# Patient Record
Sex: FEMALE
[De-identification: names, ages, dates, MRNs, and addresses within clinical notes are randomized per-mention and may not be internally consistent; named-entity substitution may affect disease eponyms.]

---

## 2017-12-17 ENCOUNTER — HEALTH MAINTENANCE LETTER (OUTPATIENT)
Age: 47
End: 2017-12-17

## 2018-05-22 ENCOUNTER — RECORDS - HEALTHEAST (OUTPATIENT)
Dept: ADMINISTRATIVE | Facility: OTHER | Age: 48
End: 2018-05-22

## 2018-05-25 ENCOUNTER — AMBULATORY - HEALTHEAST (OUTPATIENT)
Dept: SURGERY | Facility: CLINIC | Age: 48
End: 2018-05-25

## 2018-05-25 DIAGNOSIS — E66.01 MORBID OBESITY (H): ICD-10-CM

## 2018-05-30 ENCOUNTER — COMMUNICATION - HEALTHEAST (OUTPATIENT)
Dept: SURGERY | Facility: CLINIC | Age: 48
End: 2018-05-30

## 2018-06-12 ENCOUNTER — COMMUNICATION - HEALTHEAST (OUTPATIENT)
Dept: SURGERY | Facility: CLINIC | Age: 48
End: 2018-06-12

## 2018-08-15 ENCOUNTER — OFFICE VISIT - HEALTHEAST (OUTPATIENT)
Dept: SURGERY | Facility: CLINIC | Age: 48
End: 2018-08-15

## 2018-08-15 ENCOUNTER — AMBULATORY - HEALTHEAST (OUTPATIENT)
Dept: LAB | Facility: CLINIC | Age: 48
End: 2018-08-15

## 2018-08-15 DIAGNOSIS — I10 HYPERTENSION, UNSPECIFIED TYPE: ICD-10-CM

## 2018-08-15 DIAGNOSIS — E66.01 MORBID OBESITY (H): ICD-10-CM

## 2018-08-15 DIAGNOSIS — E78.5 HYPERLIPIDEMIA, UNSPECIFIED HYPERLIPIDEMIA TYPE: ICD-10-CM

## 2018-08-15 DIAGNOSIS — F32.A DEPRESSION, UNSPECIFIED DEPRESSION TYPE: ICD-10-CM

## 2018-08-15 DIAGNOSIS — R73.03 PRE-DIABETES: ICD-10-CM

## 2018-08-15 DIAGNOSIS — G47.33 OBSTRUCTIVE SLEEP APNEA: ICD-10-CM

## 2018-08-15 DIAGNOSIS — F41.9 ANXIETY: ICD-10-CM

## 2018-08-15 LAB — HBA1C MFR BLD: 5.7 % (ref 3.5–6)

## 2018-08-15 ASSESSMENT — MIFFLIN-ST. JEOR: SCORE: 2171.04

## 2018-09-21 ENCOUNTER — OFFICE VISIT - HEALTHEAST (OUTPATIENT)
Dept: SURGERY | Facility: CLINIC | Age: 48
End: 2018-09-21

## 2018-09-21 DIAGNOSIS — E66.01 OBESITY, MORBID, BMI 50 OR HIGHER (H): ICD-10-CM

## 2018-09-21 DIAGNOSIS — R73.03 PRE-DIABETES: ICD-10-CM

## 2018-09-21 DIAGNOSIS — Z71.3 DIETARY COUNSELING: ICD-10-CM

## 2018-09-21 DIAGNOSIS — I10 HYPERTENSION, UNSPECIFIED TYPE: ICD-10-CM

## 2018-09-21 DIAGNOSIS — E78.5 HYPERLIPIDEMIA, UNSPECIFIED HYPERLIPIDEMIA TYPE: ICD-10-CM

## 2018-09-21 ASSESSMENT — MIFFLIN-ST. JEOR: SCORE: 2166.51

## 2018-09-24 ENCOUNTER — AMBULATORY - HEALTHEAST (OUTPATIENT)
Dept: SURGERY | Facility: CLINIC | Age: 48
End: 2018-09-24

## 2018-09-24 DIAGNOSIS — E66.01 MORBID OBESITY (H): ICD-10-CM

## 2018-09-28 ENCOUNTER — COMMUNICATION - HEALTHEAST (OUTPATIENT)
Dept: SURGERY | Facility: CLINIC | Age: 48
End: 2018-09-28

## 2018-10-11 ENCOUNTER — OFFICE VISIT - HEALTHEAST (OUTPATIENT)
Dept: PHYSICAL THERAPY | Facility: REHABILITATION | Age: 48
End: 2018-10-11

## 2018-10-11 DIAGNOSIS — Z74.09 DECREASED FUNCTIONAL MOBILITY AND ENDURANCE: ICD-10-CM

## 2018-10-11 DIAGNOSIS — G89.29 CHRONIC MIDLINE LOW BACK PAIN WITHOUT SCIATICA: ICD-10-CM

## 2018-10-11 DIAGNOSIS — M54.50 CHRONIC MIDLINE LOW BACK PAIN WITHOUT SCIATICA: ICD-10-CM

## 2018-10-15 ENCOUNTER — OFFICE VISIT - HEALTHEAST (OUTPATIENT)
Dept: PHYSICAL THERAPY | Facility: REHABILITATION | Age: 48
End: 2018-10-15

## 2018-10-15 DIAGNOSIS — F41.9 ANXIETY: ICD-10-CM

## 2018-10-15 DIAGNOSIS — M54.50 CHRONIC MIDLINE LOW BACK PAIN WITHOUT SCIATICA: ICD-10-CM

## 2018-10-15 DIAGNOSIS — F32.A DEPRESSION, UNSPECIFIED DEPRESSION TYPE: ICD-10-CM

## 2018-10-15 DIAGNOSIS — E78.5 HYPERLIPIDEMIA, UNSPECIFIED HYPERLIPIDEMIA TYPE: ICD-10-CM

## 2018-10-15 DIAGNOSIS — Z74.09 DECREASED FUNCTIONAL MOBILITY AND ENDURANCE: ICD-10-CM

## 2018-10-15 DIAGNOSIS — G89.29 CHRONIC MIDLINE LOW BACK PAIN WITHOUT SCIATICA: ICD-10-CM

## 2018-10-16 ENCOUNTER — OFFICE VISIT - HEALTHEAST (OUTPATIENT)
Dept: SURGERY | Facility: CLINIC | Age: 48
End: 2018-10-16

## 2018-10-16 DIAGNOSIS — E66.01 MORBID OBESITY (H): ICD-10-CM

## 2018-10-16 DIAGNOSIS — R73.03 PRE-DIABETES: ICD-10-CM

## 2018-10-16 ASSESSMENT — MIFFLIN-ST. JEOR: SCORE: 2159.7

## 2018-10-24 ENCOUNTER — OFFICE VISIT - HEALTHEAST (OUTPATIENT)
Dept: PHYSICAL THERAPY | Facility: REHABILITATION | Age: 48
End: 2018-10-24

## 2018-10-24 DIAGNOSIS — F32.A DEPRESSION, UNSPECIFIED DEPRESSION TYPE: ICD-10-CM

## 2018-10-24 DIAGNOSIS — Z74.09 DECREASED FUNCTIONAL MOBILITY AND ENDURANCE: ICD-10-CM

## 2018-10-24 DIAGNOSIS — E78.5 HYPERLIPIDEMIA, UNSPECIFIED HYPERLIPIDEMIA TYPE: ICD-10-CM

## 2018-10-24 DIAGNOSIS — G89.29 CHRONIC MIDLINE LOW BACK PAIN WITHOUT SCIATICA: ICD-10-CM

## 2018-10-24 DIAGNOSIS — F41.9 ANXIETY: ICD-10-CM

## 2018-10-24 DIAGNOSIS — M54.50 CHRONIC MIDLINE LOW BACK PAIN WITHOUT SCIATICA: ICD-10-CM

## 2018-10-30 ENCOUNTER — OFFICE VISIT - HEALTHEAST (OUTPATIENT)
Dept: PHYSICAL THERAPY | Facility: REHABILITATION | Age: 48
End: 2018-10-30

## 2018-10-30 DIAGNOSIS — Z74.09 DECREASED FUNCTIONAL MOBILITY AND ENDURANCE: ICD-10-CM

## 2018-10-30 DIAGNOSIS — G89.29 CHRONIC MIDLINE LOW BACK PAIN WITHOUT SCIATICA: ICD-10-CM

## 2018-10-30 DIAGNOSIS — M54.50 CHRONIC MIDLINE LOW BACK PAIN WITHOUT SCIATICA: ICD-10-CM

## 2018-11-06 ENCOUNTER — OFFICE VISIT - HEALTHEAST (OUTPATIENT)
Dept: PHYSICAL THERAPY | Facility: REHABILITATION | Age: 48
End: 2018-11-06

## 2018-11-06 DIAGNOSIS — M54.50 CHRONIC MIDLINE LOW BACK PAIN WITHOUT SCIATICA: ICD-10-CM

## 2018-11-06 DIAGNOSIS — Z74.09 DECREASED FUNCTIONAL MOBILITY AND ENDURANCE: ICD-10-CM

## 2018-11-06 DIAGNOSIS — G89.29 CHRONIC MIDLINE LOW BACK PAIN WITHOUT SCIATICA: ICD-10-CM

## 2018-11-20 ENCOUNTER — OFFICE VISIT - HEALTHEAST (OUTPATIENT)
Dept: PHYSICAL THERAPY | Facility: REHABILITATION | Age: 48
End: 2018-11-20

## 2018-11-20 DIAGNOSIS — G89.29 CHRONIC MIDLINE LOW BACK PAIN WITHOUT SCIATICA: ICD-10-CM

## 2018-11-20 DIAGNOSIS — Z74.09 DECREASED FUNCTIONAL MOBILITY AND ENDURANCE: ICD-10-CM

## 2018-11-20 DIAGNOSIS — M54.50 CHRONIC MIDLINE LOW BACK PAIN WITHOUT SCIATICA: ICD-10-CM

## 2018-12-18 ENCOUNTER — OFFICE VISIT - HEALTHEAST (OUTPATIENT)
Dept: SURGERY | Facility: CLINIC | Age: 48
End: 2018-12-18

## 2018-12-18 DIAGNOSIS — R73.03 PRE-DIABETES: ICD-10-CM

## 2018-12-18 DIAGNOSIS — E66.01 MORBID OBESITY (H): ICD-10-CM

## 2018-12-18 ASSESSMENT — MIFFLIN-ST. JEOR: SCORE: 2150.63

## 2018-12-19 ENCOUNTER — OFFICE VISIT - HEALTHEAST (OUTPATIENT)
Dept: PHYSICAL THERAPY | Facility: REHABILITATION | Age: 48
End: 2018-12-19

## 2018-12-19 DIAGNOSIS — G89.29 CHRONIC MIDLINE LOW BACK PAIN WITHOUT SCIATICA: ICD-10-CM

## 2018-12-19 DIAGNOSIS — F32.A DEPRESSION, UNSPECIFIED DEPRESSION TYPE: ICD-10-CM

## 2018-12-19 DIAGNOSIS — Z74.09 DECREASED FUNCTIONAL MOBILITY AND ENDURANCE: ICD-10-CM

## 2018-12-19 DIAGNOSIS — F41.9 ANXIETY: ICD-10-CM

## 2018-12-19 DIAGNOSIS — M54.50 CHRONIC MIDLINE LOW BACK PAIN WITHOUT SCIATICA: ICD-10-CM

## 2019-01-14 ENCOUNTER — AMBULATORY - HEALTHEAST (OUTPATIENT)
Dept: ADMINISTRATIVE | Facility: REHABILITATION | Age: 49
End: 2019-01-14

## 2019-01-14 DIAGNOSIS — M25.562 LEFT KNEE PAIN: ICD-10-CM

## 2019-01-18 ENCOUNTER — OFFICE VISIT - HEALTHEAST (OUTPATIENT)
Dept: PHYSICAL THERAPY | Facility: REHABILITATION | Age: 49
End: 2019-01-18

## 2019-01-18 DIAGNOSIS — M62.81 MUSCLE WEAKNESS (GENERALIZED): ICD-10-CM

## 2019-01-18 DIAGNOSIS — M25.562 ACUTE PAIN OF LEFT KNEE: ICD-10-CM

## 2019-01-25 ENCOUNTER — OFFICE VISIT - HEALTHEAST (OUTPATIENT)
Dept: PHYSICAL THERAPY | Facility: REHABILITATION | Age: 49
End: 2019-01-25

## 2019-01-25 DIAGNOSIS — M62.81 MUSCLE WEAKNESS (GENERALIZED): ICD-10-CM

## 2019-01-25 DIAGNOSIS — M25.562 ACUTE PAIN OF LEFT KNEE: ICD-10-CM

## 2019-02-19 ENCOUNTER — COMMUNICATION - HEALTHEAST (OUTPATIENT)
Dept: PHYSICAL THERAPY | Facility: REHABILITATION | Age: 49
End: 2019-02-19

## 2019-02-28 ENCOUNTER — OFFICE VISIT - HEALTHEAST (OUTPATIENT)
Dept: PHYSICAL THERAPY | Facility: REHABILITATION | Age: 49
End: 2019-02-28

## 2019-02-28 DIAGNOSIS — M62.81 MUSCLE WEAKNESS (GENERALIZED): ICD-10-CM

## 2019-02-28 DIAGNOSIS — Z74.09 DECREASED FUNCTIONAL MOBILITY AND ENDURANCE: ICD-10-CM

## 2019-02-28 DIAGNOSIS — M25.562 ACUTE PAIN OF LEFT KNEE: ICD-10-CM

## 2019-03-28 ENCOUNTER — COMMUNICATION - HEALTHEAST (OUTPATIENT)
Dept: SURGERY | Facility: CLINIC | Age: 49
End: 2019-03-28

## 2019-05-13 ENCOUNTER — AMBULATORY - HEALTHEAST (OUTPATIENT)
Dept: ADMINISTRATIVE | Facility: REHABILITATION | Age: 49
End: 2019-05-13

## 2019-05-13 DIAGNOSIS — G89.29 CHRONIC PAIN OF LEFT KNEE: ICD-10-CM

## 2019-05-13 DIAGNOSIS — M25.562 CHRONIC PAIN OF LEFT KNEE: ICD-10-CM

## 2020-03-02 ENCOUNTER — HEALTH MAINTENANCE LETTER (OUTPATIENT)
Age: 50
End: 2020-03-02

## 2020-12-20 ENCOUNTER — HEALTH MAINTENANCE LETTER (OUTPATIENT)
Age: 50
End: 2020-12-20

## 2021-01-15 ENCOUNTER — HEALTH MAINTENANCE LETTER (OUTPATIENT)
Age: 51
End: 2021-01-15

## 2021-04-24 ENCOUNTER — HEALTH MAINTENANCE LETTER (OUTPATIENT)
Age: 51
End: 2021-04-24

## 2021-07-15 VITALS
HEIGHT: 62 IN | BODY MASS INDEX: 53.92 KG/M2 | HEIGHT: 62 IN | HEIGHT: 62 IN | BODY MASS INDEX: 53.92 KG/M2 | HEIGHT: 62 IN | WEIGHT: 293 LBS | BODY MASS INDEX: 53.92 KG/M2 | BODY MASS INDEX: 53.92 KG/M2 | WEIGHT: 293 LBS | WEIGHT: 293 LBS | WEIGHT: 293 LBS

## 2021-07-15 NOTE — PROGRESS NOTES
[facility] Daily Progress     Patient Name: Nakia Melo  Date: 2018  Visit #:5  PTA visit #:    Date of evaluation: 10/11/2018  Referral Diagnosis: Morbid obesity (H), Back Pain   Referring provider: Nilam Simmons,*  Visit Diagnosis:     ICD-10-CM    1. Decreased functional mobility and endurance Z74.09    2. Chronic midline low back pain without sciatica M54.5     G89.29          Assessment:   Back Pain is resolving, she is more active in general and has more strength, energy and stamina than before, she is still working on consistency with walking but exercises are getting done regularly.   Patient is benefitting from skilled physical therapy and is making steady progress toward functional goals.  Patient is appropriate to continue with skilled physical therapy intervention, as indicated by initial plan of care.    Goal Status:  Pt. will be independent with home exercise program in : 12 weeks  Pt. will be able to walk : 20 minutes;30 minutes;with less pain;with less difficulty;for community mobility;for exercise/recreation;in 12 weeks  Pt. will improve gait speed : > 1.0m/s;for decreased risk of falls;for improved community ambulation;in 12 weeks  Patient will stand : 30 minutes;with less difficultty;for home chores;in 12 weeks  Pt will: increase 30 sec sit to stands to 14 reps or more in 10 weeks  Pt will: increase 2 minute walk distance to >150 meters wiht RPE of 4 or less in 10 weeks    Plan / Patient Education:     Continue with initial plan of care.  Progress with home program as tolerated.  Follow up in 2 weeks, progress exercises as able   Subjective:   Pain Ratin    Patient reports her feet are a little sore today. Exercises are going ok at home.     Objective:   Pt did well on the treadmill with increased speed and time. Subjective report of legs being tired but having more energy in general.     Treatment Today     TREATMENT MINUTES COMMENTS   Evaluation     Self-care/  Home management     Manual therapy     Neuromuscular Re-education     Therapeutic Activity     Therapeutic Exercises 43 - Treadmill @ 2.0 MPH x 8' RPE 8/10  - bridges 10 sec hold  x 10 reps   - TA set with march x 20 reps B  - SLR x 15 reps B  - hooklying hip abduction x 20 reps L3 band   -seated march with 4# weights arm curls 2 x10 B alternating sides   -LAQ with overhead press 4# weights 2 x 10 B alternating side  - Nustep WL 5  x5'   - Walk in clinic 3 minute walk test 174.4 meters RPE 9/10  -sit to stand x 20 from high low mat  -Multifidus 3 directions x10 each L3 band in standing   - standing side step with band to the side; L3 band preventing twisting x 10 reps each side    Gait training     Modality__________________                Total 43    Blank areas are intentional and mean the treatment did not include these items.     Laura Dominguez, PT, DPT, CLT  11/6/2018

## 2021-07-15 NOTE — PROGRESS NOTES
[facility]   [facility] Initial Evaluation    Patient Name: Nakia Melo  Date of evaluation: 10/11/2018  Referral Diagnosis: Morbid obesity (H)  Referring provider: Nilam Simmons,*  Visit Diagnosis:     ICD-10-CM    1. Decreased functional mobility and endurance Z74.09    2. Chronic midline low back pain without sciatica M54.5     G89.29        Assessment:     Nakia Melo is a 47 y.o. female who presents to therapy today with chief complaints of back pain limiting activities and weight loss goals. Patient presents below age gender norms in 30 sec sit to stands, 2 minute walk test and gait speed. Patient has mild balance issues with unstable surfaces and when eyes are closed with history of some vertigo issues. Patient is uncertain of what her own goals are currently but does enjoy walking which as been limited by back pain recently. Pateint demonstrated min dec in ROM in the lumbar spine with tightness in hamstrings but it does not increase her pain, low back felt fatigued with sit to stands and 2 min walk but not painful. Patient will benefit from skilled PT intervention to increase strength, endurance and ROM in order to improve overall mobility.     Patient will benefit from 1:1 skilled PT services to address the above limitations.     Goals:  Pt. will be independent with home exercise program in : 12 weeks  Pt. will be able to walk : 20 minutes;30 minutes;with less pain;with less difficulty;for community mobility;for exercise/recreation;in 12 weeks  Pt. will improve gait speed : > 1.0m/s;for decreased risk of falls;for improved community ambulation;in 12 weeks  Patient will stand : 30 minutes;with less difficultty;for home chores;in 12 weeks  Pt will: increase 30 sec sit to stands to 14 reps or more in 10 weeks  Pt will: increase 2 minute walk distance to >150 meters wiht RPE of 4 or less in 10 weeks    Patient's expectations/goals are realistic.    Barriers to Learning or  Achieving Goals:  Chronicity of the problem.  Co-morbidities or other medical factors.  JESSICA, HTN, depression, anxiety, pre-diabetes        Plan / Patient Instructions:        Plan of Care:   Authorization / Certification Start Date: 10/11/18  Authorization / Certification End Date: 01/03/19  Communication with: Referral Source  Patient Related Instruction: Nature of Condition;Treatment plan and rationale;Self Care instruction;Basis of treatment;Body mechanics;Posture;Precautions;Next steps;Expected outcome  Times per Week: 1  Number of Weeks: 12  Number of Visits: 12  Therapeutic Exercise: ROM;Stretching;Strengthening  Neuromuscular Reeducation: kinesio tape;posture;core;balance/proprioception    POC and pathology of condition were reviewed with patient.  Pt. is in agreement with the Plan of Care  A Home Exercise Program (HEP) was initiated today.  Pt. was instructed in exercises by PT and patient was given a handout with detailed instructions.    Plan for next visit: begin edurance exercises, NuStep. Walking, LE strengtheing exercises, core, develop HEP.     Treatment techniques, plan of care, and goals were discussed with the patient.  The patient agrees to the plan as outlined.  The plan of care is dynamic and will be modified on an ongoing basis.       Subjective:       Patient has been referred by Dr Simmons for PT in order to increase activity and exercise for weight loss and back pain. Pain had been present for more than 20 years. 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 She had an MRI originally and demonstrated a disc herniation and had had a series of 3 injection x 2 over the course of 2 years and the better. Now the pain is always there and will flare up. Pain will flare up with standing and walking for longer period of time. Bending and reaching will increase pain as well. Pain is described generally  and aching pain with ocassionally get a sharp pain and can shoot into the right leg. She had done therapy for the back pain but was not successful with this. Laying down or sitting will make the pain better.   She has been going to the pool to exercise during an open pool time and does her own exercises, has not gone in the last couple of weeks, she was doing this one time per week. She is taking phentermine for weight loss x 2 months and feels its been helpful. She does have access to a gym,     Past Medical History/comorbidities: HTN, JESSICA, anxiety, pre-diabetes    Current and Prior Treatments: (medications, diet) phentermine for weight loss. Blood pressure and depression medications      Occupation: billing   Work Status: Working full time  Living Situation: twin home, single story, no steps   Caregiver Support:     Equipment:  None  Current Activity Level: non existent    Patient's Chief Complaint: pain in the back and hips limit her activities    Functional Limitations:  ascending and descending stairs or curbs  lifting  standing >5-10  transitional movements sit to stand, sit to supine and rolling in bed  walking >5 min     Patient's Functional Goal: I dont know yet     Pain:   Pain Ratin  Pain rating at best: 1  Pain rating at worst: 9  Pain description: aching, sharp and shooting       Objective:      Note: Items left blank indicates the item was not performed or not indicated at the time of the evaluation.      Posture Observation:      General sitting posture is  normal.  General standing posture is normal.  Lumbopelvic complex: Mildly increased lumbar lordosis    BMI: 65.6         Functional Mobility  Transfers: (sit to stand, sit to supine, floor to stand) independent to modified independent    Back Scratch Test: 31cm with right over top, 34 cm with left over top    Chair Sit and Reach: 4 cm on left, 12 cm on right side      Selective Functional Movement Screen  Active cervical Flexion -  functional and non painful  Active cervical extension - functional and non painful    Cervical rotation-lateral bend - functional and non painful    Upper extremity Pattern 1 - dysfunctional and non painful    Upper extremity Pattern 2 - dysfunctional and non painful    Multi-Segmental Flexion - dysfunctional - to upper tibia, non painful, tightness    Multi-Segmental Extension - dysfunctional, non painful    Single Leg stance - dysfunctional, non painful 2-3 seconds on each side    Overhead Deep Squat - dysfunctional, non painful      Lumbar ROM:  Date: 10/11/2018     *Indicate scale AROM AROM AROM   Lumbar Flexion To knee, non painful, tight in back and legs     Lumbar Extension Min dec, non painful       Right Left Right Left Right Left   Lumbar Sidebending WNL WNL       Lumbar Rotation WNL WNL           Lower Extremity ROM: 10/11/2018  Date: 10/11/2018      Right Left Right Left Right Left   Hip Flexion (0-120 ) 5 5       Hip Abduction (0-45 ) 4+ 4+       Hip External Rotation (0-50 ) 5 5       Hip Internal Rotation (0-40 ) 5 5       Hip Extension (0-15 ) 5 5       Knee Flexion 5 5       Knee Extension 5 5       Ankle Dorsiflexion 5 5       Ankle Plantarflexion         Ankle Inversion         Ankle Eversion         MMT of the upper extremities demonstrated mild weakness in scaption bilaterally, WNL on all other positions     30 sec Sit to stands: 10 reps   UE assist: none   Age/Gender Norms: >15 reps     Arm Curl Test: 12 reps  Weight used: 5#   Age/Gender Norm: 13-19 reps     Gait speed (10 meter walk test):  Comfortable gait speed: 0.79 m/s  Number of steps: 12  Age gender norm: >1.44 m/s  AD used? None     Fast gait speed: 1.13 m/s  Number of steps: 10  Age gender norm: >1.87 m/s  AD used? None     Activity Tolerance Testin  Minute Walk Test:   Total meters walked: 124.5 meters   AD used? None   Age/Gender Norms: 183.0 meters    RPE: 5-6/10  Pain - more in the ankles       Balance Examination    Firm  Surface (seconds) Unstable Surface (seconds)     EO EC EO EC   Romberg (feet together) WNL 8.8 seconds  12.5 seconds    Sharpened Romberg (tandem)  WNL 5-6 seconds each way        Single Leg Stance   2-3 sec on each side              Treatment Today   10/11/2018  TREATMENT MINUTES COMMENTS   Evaluation 35    Self-care/ Home management     Manual therapy     Neuromuscular Re-education     Therapeutic Activity     Therapeutic Exercises 25 Demo/performance of HEP  Patient educated on pathology  Discussed POC  Discussion about current activities levels and resources patient has available to to her.   Discussed her goals and actvities she enjoys  Initiated HEP   - sit to stands x 10 reps 3 times/day  - 3-5 minute walks one time per day      Gait training     Modality__________________                Total 60     Blank areas are intentional and mean the treatment did not include these items.     PT Evaluation Code: (Please list factors)  Patient History/Comorbidities: as above   Examination: as above   Clinical Presentation: stable   Clinical Decision Making: low     Patient History/  Comorbidities Examination  (body structures and functions, activity limitations, and/or participation restrictions) Clinical Presentation Clinical Decision Making (Complexity)   No documented Comorbidities or personal factors 1-2 Elements Stable and/or uncomplicated Low   1-2 documented comorbidities or personal factor 3 Elements Evolving clinical presentation with changing characteristics Moderate   3-4 documented comorbidities or personal factors 4 or more Unstable and unpredictable High     Laura Dominguez, PT, DPT  10/11/2018  3:05 PM

## 2021-07-15 NOTE — PROGRESS NOTES
[facility] Daily Progress/Discharge Summary     Patient Name: Nakia Melo  Date: 2018  Visit #:7  PTA visit #:    Date of evaluation: 10/11/2018  Referral Diagnosis: Morbid obesity (H), Back Pain   Referring provider: Nilam Simmons,*  Visit Diagnosis:     ICD-10-CM    1. Decreased functional mobility and endurance Z74.09    2. Chronic midline low back pain without sciatica M54.5     G89.29    3. Anxiety F41.9    4. Depression, unspecified depression type F32.9          Assessment:   Back Pain is resolving and she had met all goals and at this time she will be discharged from therapy.   Patient is benefitting from skilled physical therapy and is making steady progress toward functional goals.    Goal Status:  Pt. will be independent with home exercise program in : 12 weeks MET  Pt. will be able to walk : 20 minutes;30 minutes;with less pain;with less difficulty;for community mobility;for exercise/recreation;in 12 weeks MET   Pt. will improve gait speed : > 1.0m/s;for decreased risk of falls;for improved community ambulation;in 12 weeks MET   Patient will stand : 30 minutes;with less difficultty;for home chores;in 12 weeks MET   Pt will: increase 30 sec sit to stands to 14 reps or more in 10 weeks MET   Pt will: increase 2 minute walk distance to >150 meters wiht RPE of 4 or less in 10 weeks MET       Plan / Patient Education:     Discharge with HEP today   Subjective:   Pain Ratin  No back pain, able to stand and bowl without pain. She feels she is independent with the HEP, still challenging and progressing on her own.   At this time she feels she is ready to continue on her own.        Objective:   30 sec Sit to stands: 17 reps   Initial - 10 reps   UE assist: none   Age/Gender Norms: >15 reps     2  Minute Walk Test:   Total meters walked: 120.5 meters   Initial - 124.5 meters   AD used? None   Age/Gender Norms: 183.0 meters    RPE: 52-3/10  Pain - no pain today     Arm Curl  Test: 18 reps   Initial - 12 reps  Weight used: 5#   Age/Gender Norm: 13-19 reps     Gait speed (10 meter walk test):  Comfortable gait speed: 1.23 m/sec   Initial - 0.79 m/s (12 steps initially)   Number of steps: 9  Age gender norm: >1.44 m/s  AD used? None      Fast gait speed: 1.56 m/sec   Initial - 1.13 m/s (10 steps initially)   Number of steps: 9  Age gender norm: >1.87 m/s  AD used? None     HEP  On ex ball or seated in chair  -rows L 1 band  -Multifidus 3 directions   - seated marches on ball   - seated LAQs    Standing:   - forward step ups   - side step ups   - 4 way band hip exercises L2 band          Treatment Today     TREATMENT MINUTES COMMENTS   Evaluation     Self-care/ Home management     Manual therapy     Neuromuscular Re-education     Therapeutic Activity     Therapeutic Exercises 30 - Nustep WL 5  x8'   Retesting and review of HEP as above    Gait training     Modality__________________                Total 30    Blank areas are intentional and mean the treatment did not include these items.     Laura Dominguez, PT, DPT, CLT  12/19/2018

## 2021-07-15 NOTE — PROGRESS NOTES
[facility] Daily Progress     Patient Name: Nakia Melo  Date: 2018  Visit #:6  PTA visit #:    Date of evaluation: 10/11/2018  Referral Diagnosis: Morbid obesity (H), Back Pain   Referring provider: Nilam Simmons,*  Visit Diagnosis:   No diagnosis found.      Assessment:   Back Pain is resolving, she is more active in general and has more strength, energy and stamina than before, she is still working on consistency with walking but exercises are getting done regularly.   Patient is benefitting from skilled physical therapy and is making steady progress toward functional goals.  Patient is appropriate to continue with skilled physical therapy intervention, as indicated by initial plan of care.    Goal Status:  Pt. will be independent with home exercise program in : 12 weeks  Pt. will be able to walk : 20 minutes;30 minutes;with less pain;with less difficulty;for community mobility;for exercise/recreation;in 12 weeks  Pt. will improve gait speed : > 1.0m/s;for decreased risk of falls;for improved community ambulation;in 12 weeks  Patient will stand : 30 minutes;with less difficultty;for home chores;in 12 weeks    Pt will: increase 30 sec sit to stands to 14 reps or more in 10 weeks  Pt will: increase 2 minute walk distance to >150 meters wiht RPE of 4 or less in 10 weeks      Plan / Patient Education:     Continue with initial plan of care.  Progress with home program as tolerated.  Follow up in 3-4 weeks, progress exercises as able   Subjective:   Pain Ratin  Doing well overall, no changes to report. No pain.      Objective:   Pt did well on the treadmill with increased speed and time. Subjective report of legs being tired but having more energy in general.     Treatment Today     TREATMENT MINUTES COMMENTS   Evaluation     Self-care/ Home management     Manual therapy     Neuromuscular Re-education     Therapeutic Activity     Therapeutic Exercises 43 - Treadmill @ 2.0 MPH x  10:15' RPE 8/10  - On ex ball:  -rows L 1 band x10  -Multifidus 3 directions x10 each L1 band  - seated marches on ball x 20 reps total holding onto the mat for support on one side  - seated LAQs x 10 reps B alternating sides holding onto the mat on one side     Standing:   - forward step ups x 10 reps each side into high step march  - side step ups x 10 reps each side   - 4 way band hip exercises L2 band x 10 reps each direction bilaterally.     - Nustep WL 5  x6'    Gait training     Modality__________________                Total 43    Blank areas are intentional and mean the treatment did not include these items.     Laura Dominguez, PT, DPT, CLT  11/20/2018

## 2021-07-15 NOTE — PROGRESS NOTES
"[facility] Daily Progress     Patient Name: Nakia Melo  Date: 10/24/2018  Visit #:3  PTA visit #:  2  Referral Diagnosis: [unfilled]  Referring provider: Velma Barry MD  Visit Diagnosis:     ICD-10-CM    1. Decreased functional mobility and endurance Z74.09    2. Chronic midline low back pain without sciatica M54.5     G89.29    3. Anxiety F41.9    4. Depression, unspecified depression type F32.9    5. Hyperlipidemia, unspecified hyperlipidemia type E78.5          Assessment:     Pt tolerated walking on the treadmill with no LBP. Her LEs were tired when done.  Pt with weak hip and core muscles.  LBP with sit to stand, decreases when performing ab sets.  Patient is benefitting from skilled physical therapy and is making steady progress toward functional goals.  Patient is appropriate to continue with skilled physical therapy intervention, as indicated by initial plan of care.    Goal Status:  Pt. will be independent with home exercise program in : 12 weeks  Pt. will be able to walk : 20 minutes;30 minutes;with less pain;with less difficulty;for community mobility;for exercise/recreation;in 12 weeks  Pt. will improve gait speed : > 1.0m/s;for decreased risk of falls;for improved community ambulation;in 12 weeks  Patient will stand : 30 minutes;with less difficultty;for home chores;in 12 weeks  Pt will: increase 30 sec sit to stands to 14 reps or more in 10 weeks  Pt will: increase 2 minute walk distance to >150 meters wiht RPE of 4 or less in 10 weeks    Plan / Patient Education:     Continue with initial plan of care.  Progress with home program as tolerated.    Subjective:   Pt states her LB was sore after the last session. \" I had to ice it.\"   Pt states her back is feeling good today.  Pt reports she does her exercises when at work.  Pt did a walk on  3-4 minutes.  Pt reports increased tolerance to standing. (15minutes)  Pain Ratin        Objective:   Pt did well on the " "treadmill with increased speed and time. Subjective report of legs being tired.     Treatment Today     TREATMENT MINUTES COMMENTS   Evaluation     Self-care/ Home management     Manual therapy     Neuromuscular Re-education     Therapeutic Activity     Therapeutic Exercises 43 Treadmill @ 2.0 MPH x 6'  Nustep WL 5  x4'   Walk in clinic 200 ft (2 x 2.5 minutes)    -sit to stand x10  -seated HS stretch 30\" x2 B  -seated march 2 x10  -LAQ 2 x10  -ab sets 5\" x10 and with functional activities.  -glut sets 5\"x 10  -standing hip AB 2 x 10B  -standing HS curls 2 x10 B  -standing march x20  -standing hip ext x20  -On ex ball:  -rows L 1 band x10  -Multifidus 3 directions x10 each L1 band          Gait training     Modality__________________                Total 43    Blank areas are intentional and mean the treatment did not include these items.       Tammie Greco,MANDIE   10/24/2018      "

## 2021-07-15 NOTE — PROGRESS NOTES
[facility] Daily Progress     Patient Name: Nakia Melo  Date: 10/30/2018  Visit #:4  PTA visit #:    Date of evaluation: 10/11/2018  Referral Diagnosis: Morbid obesity (H), Back Pain   Referring provider: Nilam Simmons,*  Visit Diagnosis:     ICD-10-CM    1. Decreased functional mobility and endurance Z74.09    2. Chronic midline low back pain without sciatica M54.5     G89.29          Assessment:   Back Pain is resolving, she is more active in general and has more strength, energy and stamina than before, she is still working on consistency with walking but exercises are getting done regularly.   Patient is benefitting from skilled physical therapy and is making steady progress toward functional goals.  Patient is appropriate to continue with skilled physical therapy intervention, as indicated by initial plan of care.    Goal Status:  Pt. will be independent with home exercise program in : 12 weeks  Pt. will be able to walk : 20 minutes;30 minutes;with less pain;with less difficulty;for community mobility;for exercise/recreation;in 12 weeks  Pt. will improve gait speed : > 1.0m/s;for decreased risk of falls;for improved community ambulation;in 12 weeks  Patient will stand : 30 minutes;with less difficultty;for home chores;in 12 weeks  Pt will: increase 30 sec sit to stands to 14 reps or more in 10 weeks  Pt will: increase 2 minute walk distance to >150 meters wiht RPE of 4 or less in 10 weeks    Plan / Patient Education:     Continue with initial plan of care.  Progress with home program as tolerated.    Subjective:   Her back has been feeling really good. Patient reports she feels stronger and that she has more energy in general. Still not good at going for her 3-4 minute walks.     Pain Ratin        Objective:   Pt did well on the treadmill with increased speed and time. Subjective report of legs being tired but having more energy in general.     Treatment Today     TREATMENT  "MINUTES COMMENTS   Evaluation     Self-care/ Home management     Manual therapy     Neuromuscular Re-education     Therapeutic Activity     Therapeutic Exercises 45 - Treadmill @ 2.0 MPH x 6' RPE 6/10  - Nustep WL 5  x6'   Walk in clinic 2 minute walk test 125.5 meters RPE 5/10  -sit to stand x 20 from high low mat  -seated HS stretch 30\" x2 B  -seated march with 4# weights arm curls 2 x10 B alternating sides   -LAQ with overhead press 4# weights 2 x 10 B alternating sides   -On ex ball:  -rows L 1 band x10  -Multifidus 3 directions x10 each L1 band  - seated marches with arm curls x 10 reps B  -ab sets 5\" x 5, added mini marches x 10 reps B alternating sides, ball press up blue weighted ball x 10 reps   - bridges x 10 reps      Gait training     Modality__________________                Total 45    Blank areas are intentional and mean the treatment did not include these items.     Laura Dominguez, PT, DPT, CLT  10/30/2018    "

## 2021-07-15 NOTE — PROGRESS NOTES
[facility] Surgical Follow up Visit   Date of visit: 10/16/2018  Physician: Nilam Simmons MD  Primary Care is Velma Barry MD.  Nakia Melo   47 y.o.  female    Initial Weight: 354 pounds  Initial BMI: 65.8  Today's Weight:   Wt Readings from Last 1 Encounters:   10/16/18 (!) 351 lb 8 oz (159.4 kg)     Body mass index is 65.34 kg/(m^2).  Initial Weight: 354 lbs  Weight: 351 lb 8 oz (159.4 kg)  Weight loss from initial: 2.5  % Weight loss: 0.71 %     Assessment and Plan   Assessment: Nakia is a 47 y.o. year old female who presents for medical weight management.      Plan:    1. Morbid obesity (H)  Patient was congratulated on her success thus far. Healthy habits to assist with further weight loss were discussed. She has made some good changes with her diet but continues to struggle with some things.  Written information was given. She will continue to take the phentermine as it seems to help her. She will continue with walking and PT    2. Pre-diabetes  Healthy habits and weight loss should help prevent this      Follow up in 2 months with myself         INTERIM HISTORY  Patient has started phentermine and she thinks it helps to control her appetite. She recently had a head cold and fell off track a bit as her  was doing the grocery shopping.    DIETARY HISTORY  Meals Per Day: 3  Eating Protein First?: yes  Food Diary: B:2 eggs with light english muffin and cheese L:leftovers D:beef lomein, usually meat and veggies and sometimes a starch  Snacks Per Day: sometimes (worse at work)  Typical Snack: chips and candy at work- usually if she doesn't pack enough healthy food  Fluid Intake: 64 oz plus  Portion Control: improving  Calorie Containing Beverages:none  Typical Protein Food Choices: eggs, meat  Choosing Whole Grains: working on  Meals at Restaurant per week:5-6 last week because she wasn't feeling well, usually 1-2  Eating at the Table: not discussed  TV is Off During Meals:  not discussed    Positive Changes Since Last Visit: exercise, portion control  Struggling With: snacking at work, eating out    Knowledgeable in Reading Food Labels: yes  Getting Adequate Protein: yes  Sleeping 7-8 hours/day yes  Stress management : play games on cell phone    PHYSICALPT ACTIVITY PATTERNS:  Cardiovascular: PT  Strength Training: PT    REVIEW OF SYSTEMS  GENERAL/CONSTITUTIONAL:  Fatigue: sometimes  HEENT:  Vision changes, glaucoma: no  CARDIOVASCULAR:  Chest Pain with Exertion: no  PULMONARY:  Dyspnea on exertion: sometimes  NEUROLOGIC:  Paresthesias: sometimes  PSYCHIATRIC:  Moods: stable  MUSCULOSKELETAL/RHEUMATOLOGIC  Arthralgias: back pain  Myalgias: back pain  ENDOCRINE:  Monitoring Blood Sugars: na  Sugars Well Controlled: na       Patient Profile   Social History     Social History Narrative        Past Medical History   Past Medical History:   Diagnosis Date     Acid reflux      Gout      H/O degenerative disc disease      Hyperlipemia      Sleep apnea      Patient Active Problem List   Diagnosis     Obstructive sleep apnea     Depression, unspecified depression type     Anxiety     Hypertension, unspecified type     Hyperlipidemia, unspecified hyperlipidemia type     Pre-diabetes     Morbid obesity (H)     IgA nephropathy     Current Outpatient Prescriptions   Medication Sig Note     aspirin-calcium carbonate 81 mg-300 mg calcium(777 mg) Tab Take 81 mg by mouth. 8/15/2018: Received from: [facility] Received Sig: Take 81 mg by mouth daily.     atorvastatin (LIPITOR) 20 MG tablet TAKE ONE TABLET BY MOUTH ONCE DAILY [facility]     buPROPion (WELLBUTRIN XL) 150 MG 24 hr tablet  8/15/2018: Received from: External Pharmacy     FLUoxetine (PROZAC) 20 MG capsule TAKE ONE CAPSULE BY MOUTH ONCE DAILY ALONG  WITH  40MG  CAPSULE  FOR  A  TOTAL  DAILY  DOSE  OF  60MG [facility]     FLUoxetine (PROZAC) 40 MG capsule Take 40 mg by mouth.  "8/15/2018: Received from: [facility] Received Sig: Take 1 Cap by mouth daily at bedtime. Take in addition to a 20 mg tab, for a total daily dose of 60mg.     losartan (COZAAR) 100 MG tablet  8/15/2018: Received from: External Pharmacy     phentermine (ADIPEX-P) 37.5 mg tablet 1/2 tab every morning. May increase to full tab every morning after 1 week.      ranitidine (ZANTAC) 150 MG tablet Take 150 mg by mouth. 8/15/2018: Received from: [facility] Received Sig: Take 1 Tab by mouth daily at bedtime. Indications: Gastroesophageal Reflux Disease       Past Surgical History  She has no past surgical history on file.     Examination   /58 (Patient Site: Right Arm, Patient Position: Sitting, Cuff Size: Adult Large)  Pulse 94  Ht 5' 1.5\" (1.562 m)  Wt (!) 351 lb 8 oz (159.4 kg)  SpO2 96%  Breastfeeding? No  BMI 65.34 kg/m2  Height: 5' 1.5\" (1.562 m) (10/16/2018  2:55 PM)  Initial Weight: 354 lbs (10/16/2018  2:55 PM)  Weight: 351 lb 8 oz (159.4 kg) (10/16/2018  2:55 PM)  Weight loss from initial: 2.5 (10/16/2018  2:55 PM)  % Weight loss: 0.71 % (10/16/2018  2:55 PM)  BMI (Calculated): 65.3 (10/16/2018  2:55 PM)  SpO2: 96 % (10/16/2018  2:55 PM)  Waist Circumference (In): 56.5 Inches (8/15/2018  9:36 AM)  Hip Circumference (In): 64.5 Inches (8/15/2018  9:36 AM)  Neck Circumference (In): 17.75 Inches (8/15/2018  9:36 AM)  General:  Alert and ambulatory, NAD  HEENT:  No conjunctival pallor, moist mucous Membranes, neck is without LAD  Pulmonary:  Normal respiratory effort, no cough, no audible wheezes/crackles.  CV:  Regular rate and Rhythm, no murmurs  Abdominal: BS normal,soft, NT without rebound or guarding  Pscyh/Mood: stable         Counseling:   We reviewed the important post op bariatric recommendations:  -eating 3 meals daily  -eating protein first, getting >60gm protein daily  -eating slowly, chewing food well  -avoiding/limiting calorie containing beverages  -limiting starchy vegetables and " carbohydrates, choosing wheat, not white with breads,   crackers, pastas, mariana, bagels, tortillas, rice  -limiting restaurant or cafeteria eating to twice a week or less    We discussed the importance of restorative sleep and stress management in maintaining a healthy weight.  We discussed the National Weight Control Registry healthy weight maintenance strategies and ways to optimize metabolism.  We discussed the importance of physical activity including cardiovascular and strength training in maintaining a healthier weight.    > 25 min spent with patient, > 50% spent in counseling         ALEXANDRIA Simmons MD  [facility].    Much or all of the text in this note was generated through the use of Dragon Dictate voice-to-text software. Errors in spelling or words which seem out of context are unintentional. Sound alike errors, in particular, may have escaped editing.

## 2021-07-15 NOTE — PROGRESS NOTES
"[facility] Daily Progress     Patient Name: Nakia Melo  Date: 10/15/2018  Visit #:2  PTA visit #:  1  Referral Diagnosis: [unfilled]  Referring provider: Velma Barry MD  Visit Diagnosis:     ICD-10-CM    1. Decreased functional mobility and endurance Z74.09    2. Chronic midline low back pain without sciatica M54.5     G89.29          Assessment:   Pt returns today for her first follow up appointment.  Pt tolerated walking on the treadmill with no LBP. Her LEs were tired when done.  Pt with weak hip and core muscles.  LBP with sit to stand, decreases when performing ab sets.  Patient is benefitting from skilled physical therapy and is making steady progress toward functional goals.  Patient is appropriate to continue with skilled physical therapy intervention, as indicated by initial plan of care.    Goal Status:  Pt. will be independent with home exercise program in : 12 weeks  Pt. will be able to walk : 20 minutes;30 minutes;with less pain;with less difficulty;for community mobility;for exercise/recreation;in 12 weeks  Pt. will improve gait speed : > 1.0m/s;for decreased risk of falls;for improved community ambulation;in 12 weeks  Patient will stand : 30 minutes;with less difficultty;for home chores;in 12 weeks  Pt will: increase 30 sec sit to stands to 14 reps or more in 10 weeks  Pt will: increase 2 minute walk distance to >150 meters wiht RPE of 4 or less in 10 weeks    Plan / Patient Education:     Continue with initial plan of care.  Progress with home program as tolerated.    Subjective:   \"It's sore.\"  Pt states she was standing a lot over the weekend to prep food.\"  Pt has been doing some walking inside her home.   Pain Rating: 3-4/10        Objective:   Pt tolerates walking on the treadmill with ou trest breaks.  Treatment Today     TREATMENT MINUTES COMMENTS   Evaluation     Self-care/ Home management     Manual therapy     Neuromuscular Re-education     Therapeutic " "Activity     Therapeutic Exercises 30 Treadmill @ 1.6MPH x 5'  -sit to stand x10  -seated HS stretch 30\" x2 B  -seated march 2 x10  -LAQ 2 x10  -ab sets 5\" x10 and with functional activities.  -glut sets 5\"x 10  -standing hip AB 2 x 10B  -standing HS curls 2 x10 B          Gait training     Modality__________________                Total 30    Blank areas are intentional and mean the treatment did not include these items.       Tammie Greco,CLT   10/15/2018      "

## 2021-07-15 NOTE — PROGRESS NOTES
[facility]   Knee Initial Evaluation    Patient Name: Nakia Melo  Date of evaluation: 1/18/2019  Referral Diagnosis: Left knee pain  Referring provider: Velma Barry MD  Visit Diagnosis:     ICD-10-CM    1. Acute pain of left knee M25.562    2. Muscle weakness (generalized) M62.81        Assessment:      Impairments in  pain, posture, ROM, joint mobility, strength  The POC is dynamic and will be modified on an ongoing basis.  Barriers to achieving goals as noted in the assessment section may affect outcome.  Prognosis to achieve goals is  good   Pt. is appropriate for skilled PT intervention as outlined in the Plan of Care (POC).  Pt. is a good candidate for skilled PT services to improve pain levels and function.     Nakia Melo is a 48 y.o. female who presents to therapy today with chief complaints of left knee pain that started in mid December and is progressively getting a little worse again. Patient reports pain with initially standing after sleep and sitting for a longer periord of time, increased pain limiting sleep and pain when walking, kneeling and bending the knees. Patient has negative special tests for ligaments or meniscal injuries. Patient will benefit from skilled therapy to increase joint mobility, ROM, strength in order to decrease pain and improve mobility.       Goals:  Pt. will be independent with home exercise program in : 6 weeks  Pt. will have improved quality of sleep: waking less times/night;with less pain;in 6 weeks  Pt. will be able to walk : 20 minutes;with less pain;with less difficulty;for community mobility;for exercise/recreation;in 6 weeks  Pt. will bend: to dress;to clean;with less pain;with less difficulty;in 6 weeks  Patient will transfer: sit/stand;supine/sit;for in/out of bed;for in/out of chair;with less pain;in 6 weeks    No Data Recorded    Goals and plan of care were set in collaboration with the patient.    Patient's expectations/goals are  realistic.    Barriers to Learning or Achieving Goals:  Chronicity of the problem.  Co-morbidities or other medical factors.  JESSICA, depression and anxiety, HTN, Pre-diabetes, morbid obesity       Patient educated on and demonstrated understanding of nature of impairment, plan of care, patient role and HEP. Patient compliant with PT and prognosis is good. Patient would benefit from skilled PT to progress and improve range of motion, flexibility and tissue extensibility, joint mobility and pain.     Plan / Patient Instructions:      Plan of Care:   Authorization / Certification Start Date: 01/18/19  Communication with: Referral Source  Patient Related Instruction: Nature of Condition;Treatment plan and rationale;Self Care instruction;Basis of treatment;Body mechanics;Posture;Precautions;Next steps;Expected outcome  Times per Week: 1  Number of Weeks: 6-8  Number of Visits: 8  Discharge Planning: when goals has been met or plateau in progress has been made   Therapeutic Exercise: ROM;Stretching;Strengthening  Neuromuscular Reeducation: kinesio tape;posture;balance/proprioception;core  Manual Therapy: soft tissue mobilization;myofascial release;joint mobilization;muscle energy  Modalities: hot pack;iontophoresis;ultrasound;cold pack      Plan for next visit: joint mobs, NuStep, progress exercises, TKE, clam shells, bridges, SLR     Subjective:        Social information:   Living Situation:single family home   Occupation:billing   Work Status:Working full time   Equipment Available: None    History of Present Illness:    Nakia is a 48 y.o. female who presents to therapy today with complaints of left knee pain that started in Dec 2018 without a specific injury. She describes the pain as aching constantly there is sharp stabbing pain in the knee when she goes to get up from sleeping or sitting after being static for a longer period of time.  No images done at this time. No injuries in the recent past , 10-15 years ago she  fell on steps landing and sliding down the steps on both knees. Her back has been increased in pain slightly due to limping from her knee pain. Pain is better with salon pas or heat, ice does not seem to help with the pain. She was treated for gout recently as well which the medication for the gout the knee pain improved, when she finished the medication the knee pain came back again.       Pain Ratin  Pain rating at best: 1  Pain rating at worst: 8  Pain description:aching and dull, sharp at times.     Functional limitations are described as occurring with:   ascending and descending stairs or curbs  bending  sleeping    Patient reports benefit from:  movement or exercise        Objective:      Note: Items left blank indicates the item was not performed or not indicated at the time of the evaluation.    Patient Outcome Measures :    Lower Extremity Functional Scale (_/80): 29     Scores range from 0-80, where a score of 80 represents maximum function. The minimal clinically important difference is a positive change of 9 points.    Knee Examination  1. Acute pain of left knee     2. Muscle weakness (generalized)       Precautions/Restrictions:  None  Involved Side: Left    Assistive Device: None  Gait Observation: mildly antalgic gait   Lumbar Clearing: Does not provoke symptoms     Hip Clearing: Does not provoke symptoms    Knee ROM Within normal limits unless otherwise indicated     Date:  2019    AROM in degrees  Right   Left  Right   Left  Right   Left       Knee Flexion  (130 )      110                    Knee Extension  (0 )      +4                 PROM in degrees  Right   Left  Right   Left  Right   Left       Knee Flexion  (130 )      115 pain                   Knee Extension  (0 )      +5                 LE Strength    Within normal limits unless otherwise indicated               Date:  2019   Strength (MMT/5)  Right   Left  Right   Left  Right   Left       Hip Flexion                          Hip Abduction                         Hip Adduction                         Hip Extension                         Hip Internal Rotation                         Hip External Rotation                         Knee Extension                         Knee Flexion                         Ankle Dorsiflexion                         Ankle Plantarflexion                       Flexibility:  Hyperextension of the knees bilaterally L > R side     Palpation:  Tenderness to the medial joint line on left knee and inferior pole of the patella     Knee Special Tests (+/-):  1/18/2019    Knee OA Cluster   Right   Left   Ligament Tests   Right   Left    1. > 51 y/o    -       Lachman          2. Stiffness > 30 min.     +      Anterior Drawer          3. Crepitus      -     Posterior Drawer          4. Bony tenderness      +     Posterior Sag          5. Bone enlargement           Valgus Stress      -    6. No warmth to the touch           Varus Stress      -     Meniscal Tests   Right   Left    Other   Right    Left       Tawanda's      -     Ely's             Joint line tenderness      +     Tr             Thessaly Thomas Apley's                        Treatment Today   1/18/2019  TREATMENT MINUTES COMMENTS   Evaluation 25    Self-care/ Home management     Manual therapy 10 Patellar mobs medial and lateral glides  AP and rotational knee mobs in hooklying and sitting    Neuromuscular Re-education     Therapeutic Activity     Therapeutic Exercises 13 Reviewed HEP from previous session and encouraged continued progress with this  Instructed to use heat on knee, get up more often at work and do no lock knee out into hyperextension with standing and when doing standing exercises  Add  - SLR supine x 10 reps   - TKE L3 band x 10 reps    Gait training     Modality__________________                Total 48    Blank areas are intentional and mean the treatment did not include these items.     PT Evaluation Code: (Please  list factors)  Patient History/Comorbidities: as above   Examination: as above   Clinical Presentation: stable   Clinical Decision Making: low     Patient History/  Comorbidities Examination  (body structures and functions, activity limitations, and/or participation restrictions) Clinical Presentation Clinical Decision Making (Complexity)   No documented Comorbidities or personal factors 1-2 Elements Stable and/or uncomplicated Low   1-2 documented comorbidities or personal factor 3 Elements Evolving clinical presentation with changing characteristics Moderate   3-4 documented comorbidities or personal factors 4 or more Unstable and unpredictable High     Laura Dominguez PT, DPT, CLT   1/18/2019  7:04 AM

## 2021-07-15 NOTE — PROGRESS NOTES
[facility] Daily Progress/Discharge Summary      Patient Name: Nakia Melo  Date: 2/28/2019  Visit #: 3  PTA visit #:    Referral Diagnosis: Left Knee  Referring provider: Velma Barry MD  Visit Diagnosis:     ICD-10-CM    1. Acute pain of left knee M25.562    2. Muscle weakness (generalized) M62.81    3. Decreased functional mobility and endurance Z74.09      Initial Assessment   Nakia Melo is a 48 y.o. female who presents to therapy today with chief complaints of left knee pain that started in mid December and is progressively getting a little worse again. Patient reports pain with initially standing after sleep and sitting for a longer periord of time, increased pain limiting sleep and pain when walking, kneeling and bending the knees. Patient has negative special tests for ligaments or meniscal injuries. Patient will benefit from skilled therapy to increase joint mobility, ROM, strength in order to decrease pain and improve mobility    Assessment:     HEP/POC compliance is  good .  Patient demonstrates understanding/independence with home program.  Pateint plan of care was to return as needed to therapy, at this time the chart was held more than 6 weeks and she has not returned to therapy. we now discharge with I HEP.     Goal Status:  Pt. will be independent with home exercise program in : 6 weeks  Pt. will have improved quality of sleep: waking less times/night;with less pain;in 6 weeks  Pt. will be able to walk : 20 minutes;with less pain;with less difficulty;for community mobility;for exercise/recreation;in 6 weeks  Pt. will bend: to dress;to clean;with less pain;with less difficulty;in 6 weeks  Patient will transfer: sit/stand;supine/sit;for in/out of bed;for in/out of chair;with less pain;in 6 weeks    Plan / Patient Education:     Continue with initial plan of care.  Progress with home program as tolerated.  Plan for next visit: follow up as needed and continue with  joint  mobs, NuStep, progress exercises, TKE, clam shells, bridges, SLR  Subjective:     Pain Rating: 3-4  Pain was doing ok but it has started to hurt when sleeping at night. The calf has been cramping proximally when walking and standing. Her feet and ankles have been painful as well.     Objective:     HEP  - SLR supine x 10 reps   - TKE L3 band x 10 reps     Treatment Today   2/28/2019  TREATMENT MINUTES COMMENTS   Evaluation     Self-care/ Home management     Manual therapy 15 AP and rotational knee mobs in hooklying and sitting   STM to gastroc laterally and proximally  Posterior glides of proximal fibula on tibia.    Neuromuscular Re-education     Therapeutic Activity     Therapeutic Exercises 20 NuStep WL 6 x 6 min   SLR x 15 reps B, cues to keep toes up to decrease medial pain  - bridges x 15 reps   - TA set up/up/down/down x 6 reps added to HEP   Reviewed hook-lying clam shells with band discussed progressing to unilaterally and alternating sides    - TKE L3 band   - step gastroc stretching 30 sec hold x 3 reps   - heel raises reviewed and added to HEP      Gait training     Modality__________________                Total 35    Blank areas are intentional and mean the treatment did not include these items.       Laura Alberto  2/28/2019

## 2021-07-15 NOTE — PATIENT INSTRUCTIONS - HE
Patient Instructions by Laura Dominguez PT at 1/18/2019  7:00 AM     Author: Laura Dominguez PT Service: -- Author Type: Physical Therapist    Filed: 1/18/2019  7:36 AM Encounter Date: 1/18/2019 Status: Signed    : Laura Dominguez PT (Physical Therapist)        TERMINAL KNEE EXTENSION - TKE    Start in a standing position with an elastic band attached to your slightly bent knee.  (Your toes should be touching the floor)    Next, draw your knee back towards a straightened position so that your heel touches the floor.      STRAIGHT LEG RAISE - SLR    While lying or sitting, raise up your leg with a straight knee.  Keep the opposite knee bent with the foot planted to the ground.

## 2021-07-15 NOTE — LETTER
Letter by Nilam Simmons MD at      Author: Nilam Simmons MD Service: -- Author Type: --    Filed:  Encounter Date: 3/28/2019 Status: (Other)         Nakia Melo  [address] [facility] [id number]               March 28, 2019      Dear Nakia:    We are sorry that you missed your appointment with Dr Simmons on 3/27/2019. Your health and follow-up medical care are important to us. Please call our office as soon as possible so that we may reschedule your appointment. If you have already rescheduled your appointment, please disregard this letter.    Sincerely,      [facility]

## 2021-07-15 NOTE — PROGRESS NOTES
[facility] Non Surgical Follow up Visit   Date of visit: 12/18/2018  Physician: Nilam Simmons MD  Primary Care is Velma Barry MD.  Nakia Melo   47 y.o.  female    Initial Weight: 354 pounds  Initial BMI: 65.8  Today's Weight:   Wt Readings from Last 1 Encounters:   12/18/18 (!) 349 lb 8 oz (158.5 kg)     Body mass index is 64.97 kg/m .  Weight: (!) 349 lb 8 oz (158.5 kg)       Assessment and Plan   Assessment: Nakia is a 47 y.o. year old female who presents for medical weight management.      Plan:    1. Morbid obesity (H)  Patient was congratulated on her success thus far. Healthy habits to assist with further weight loss were discussed. Written information was given. She will continue to take the phentermine. She will add topamax. Risks/ benefits and possible side effects were discussed. She will continue to work on healthy habits. She will continue PT. Her  is getting a new job and she may have coverage for bariatric surgery. She will let us know if she wants to pursue this.     2. Pre-diabetes  This should resolve with healthy habits and weight loss      Follow-up in 1 month with the dietitian in 3 months with myself         INTERIM HISTORY  Patient is taking the phentermine and she thinks it helps decrease her cravings and her appetite. She denies side effects from it.    DIETARY HISTORY  Meals Per Day: 3  Eating Protein First?: yes  Food Diary: B:eggs with hash browns and ham and yogurt, sometimes peppers and onions L:leftovers D:meatloaf and potatoes, sneaks the veggies in the meatloaf, will eat veggies in stir gusman or chavez  Snacks Per Day: once a week  Typical Snack: candy bars or chips  Fluid Intake: working on it, 48 ox  Portion Control: yes  Calorie Containing Beverages: lemonaide once a month  Typical Protein Food Choices: meat, eggs, yogurt  Choosing Whole Grains: sometimes  Meals at Restaurant per week:3-4  Eating at the Table: no  TV is Off During Meals:  no    Positive Changes Since Last Visit: less snacking at work  Struggling With: water intake, some snacking    Knowledgeable in Reading Food Labels: yes  Getting Adequate Protein: yes  Sleeping 7-8 hours/day yes  Stress management : sometimes she eats, will also try to pray or talk to her     PHYSICAL ACTIVITY PATTERNS:  Cardiovascular: walking 5-10 minutes  Strength Training: per PT    REVIEW OF SYSTEMS  GENERAL/CONSTITUTIONAL:  Fatigue: no  HEENT:  Vision changes, glaucoma: no  CARDIOVASCULAR:  Chest Pain with Exertion: no  PULMONARY:  Dyspnea on exertion: no  PSYCHIATRIC:  Moods: mostly good  MUSCULOSKELETAL/RHEUMATOLOGIC  Arthralgias: improving  Myalgias: improving  ENDOCRINE:  Monitoring Blood Sugars: na  Sugars Well Controlled: na       Patient Profile   Social History     Social History Narrative     Not on file        Past Medical History   Past Medical History:   Diagnosis Date     Acid reflux      Gout      H/O degenerative disc disease      Hyperlipemia      Sleep apnea      Patient Active Problem List   Diagnosis     Obstructive sleep apnea     Depression, unspecified depression type     Anxiety     Hypertension, unspecified type     Hyperlipidemia, unspecified hyperlipidemia type     Pre-diabetes     Morbid obesity (H)     IgA nephropathy     Current Outpatient Medications   Medication Sig Note     atorvastatin (LIPITOR) 20 MG tablet TAKE ONE TABLET BY MOUTH ONCE DAILY 8/15/2018: Received from: [facility]     buPROPion (WELLBUTRIN XL) 150 MG 24 hr tablet  8/15/2018: Received from: External Pharmacy     FLUoxetine (PROZAC) 20 MG capsule TAKE ONE CAPSULE BY MOUTH ONCE DAILY ALONG  WITH  40MG  CAPSULE  FOR  A  TOTAL  DAILY  DOSE  OF  60MG 8/15/2018: Received from: [facility]     FLUoxetine (PROZAC) 40 MG capsule Take 40 mg by mouth. 8/15/2018: Received from: [facility] Received Sig: Take 1 Cap by mouth daily at bedtime. Take in addition to a 20 mg tab, for a total daily dose of 60mg.  "    losartan (COZAAR) 100 MG tablet  8/15/2018: Received from: External Pharmacy     MAGNESIUM HYDROXIDE ORAL Take 400 mg by mouth daily.      phentermine (ADIPEX-P) 37.5 mg tablet 1 tablet every morning      indomethacin (INDOCIN) 50 MG capsule as needed.      ranitidine (ZANTAC) 150 MG tablet Take 150 mg by mouth. 8/15/2018: Received from: [facility] Received Sig: Take 1 Tab by mouth daily at bedtime. Indications: Gastroesophageal Reflux Disease       Past Surgical History  She has no past surgical history on file.     Examination   BP (!) 167/99 (Patient Site: Left Arm, Patient Position: Sitting, Cuff Size: Adult Large)   Pulse 93   Ht 5' 1.5\" (1.562 m)   Wt (!) 349 lb 8 oz (158.5 kg)   SpO2 93%   Breastfeeding? No   BMI 64.97 kg/m    Height: 5' 1.5\" (1.562 m) (12/18/2018  3:07 PM)  Initial Weight: 354 lbs (10/16/2018  2:55 PM)  Weight: (!) 349 lb 8 oz (158.5 kg) (12/18/2018  3:07 PM)  Weight loss from initial: 2.5 (10/16/2018  2:55 PM)  % Weight loss: 0.71 % (10/16/2018  2:55 PM)  BMI (Calculated): 65 (12/18/2018  3:07 PM)  SpO2: 93 % (12/18/2018  3:07 PM)  Waist Circumference (In): 56.5 Inches (8/15/2018  9:36 AM)  Hip Circumference (In): 64.5 Inches (8/15/2018  9:36 AM)  Neck Circumference (In): 17.75 Inches (8/15/2018  9:36 AM)    General:  Alert and ambulatory, NAD  HEENT:  No conjunctival pallor, moist mucous Membranes, neck is without LAD  Pulmonary:  Normal respiratory effort, no cough, no audible wheezes/crackles.  CV:  Regular rate and Rhythm, no murmurs  Abdominal: BS normal,soft, NT without rebound or guarding  Pscyh/Mood: stable         Counseling:   We reviewed the important post op bariatric recommendations:  -eating 3 meals daily  -eating protein first, getting >60gm protein daily  -eating slowly, chewing food well  -avoiding/limiting calorie containing beverages  -limiting starchy vegetables and carbohydrates, choosing wheat, not white with breads,   crackers, pastas, mariana, bagels, " tortillas, rice  -limiting restaurant or cafeteria eating to twice a week or less    We discussed the importance of restorative sleep and stress management in maintaining a healthy weight.  We discussed the National Weight Control Registry healthy weight maintenance strategies and ways to optimize metabolism.  We discussed the importance of physical activity including cardiovascular and strength training in maintaining a healthier weight.    > 25 min spent with patient, > 50% spent in counseling         ALEXANDRIA Simmons MD  [facility].    Much or all of the text in this note was generated through the use of Dragon Dictate voice-to-text software. Errors in spelling or words which seem out of context are unintentional. Sound alike errors, in particular, may have escaped editing.

## 2021-07-15 NOTE — PATIENT INSTRUCTIONS - HE
"Patient Instructions by Laura Dominguez PT at 1/25/2019  3:30 PM     Author: Laura Dominguez PT Service: -- Author Type: Physical Therapist    Filed: 1/25/2019  3:55 PM Encounter Date: 1/25/2019 Status: Signed    : Laura Dominguez PT (Physical Therapist)        BRIDGING    While lying on your back, tighten your lower abdominals, squeeze your buttocks and then raise your buttocks off the floor/bed as creating a \"Bridge\" with your body.        SUPINE HIP ABDUCTION - ELASTIC BAND CLAMS    Lie down on your back with your knees bent. Place an elastic band around your knees and then draw your knees apart.            "

## 2021-07-15 NOTE — PROGRESS NOTES
HPI:  Nakia Melo is a 47 y.o. year old female with weight related co-morbidities of There is no problem list on file for this patient.   who presents for medical bariatric consultation in the setting of weight related co-morbidities.  Her BMI is Body mass index is 65.8 kg/(m^2)..      Assessment: Nakia is a 47 y.o. year old female who presents for medical weight management.      Plan:    1. Morbid obesity (H)  We discussed healthy habits to assist with weight loss. We discussed medication that may assist with weight loss. phentermine was prescribed. Risks/ benefits and possible side effects were discussed and questions were answered. Written information was given. She will try planning and packing her meals.     2. Pre-diabetes  We discussed insulin resistance and how it can interfere with weight loss efforts. We discussed dietary changes to reduce insulin resistance. Her most recent creatinine was 1.27. Her most recent GFR was 50. I will hold off on prescribing metformin for now. I ordered an A1C    3. Hyperlipidemia, unspecified hyperlipidemia type  This should improve with dietary changes and weight loss    4. Hypertension, unspecified type  This may improve with dietary changes and weight loss    5. Obstructive sleep apnea  This may improve with weight loss. She will continue to use her CPAP        Follow up: next available with dietician and in 2 month with myself      >60 minutes spent with patient, > 50% spent in counseling          Counseling:  We discussed [facility] Bariatric Basics including:  -eating 3 meals daily  -eating protein first  -eating slowly, chewing food well  -avoiding/limiting calorie containing beverages  -choosing wheat, not white with breads, crackers, pastas, mariana, bagels, tortillas, rice  -limiting carbohydrates in general  -limiting restaurant or cafeteria eating to twice a week or less    We discussed the importance of restorative sleep and stress management in maintaining a  healthy weight.    We reviewed medications associated with weight gain.    We discussed insulin resistance and glycemic index as it relates to appetite and weight control.     We discussed the National Weight Control Registry healthy weight maintenance strategies and ways to optimize metabolism.  We discussed the importance of physical activity including cardiovascular and strength training in maintaining a healthier weight and explored viable options.    We discussed medications available for weight loss including phentermine, phendimetrazine, topamax, qsymia, lorcaserin, contrave, diethylproprion, and orlistat. We discussed the risks and benefits of each. We discussed indications, contraindications, potential side effects, and estimated costs of each. Literature was offered.    History Surrouding Consultation:  Struggles with weight started at age childhood  Her weight at age 18 was around 200 pounds  She has had several past supervised and unsupervised weight loss attempts  The most weight lost was: 40 pounds  Unfortunately there was not durable weight maintenance.  History of bulimia, anorexia, or binge eating disorder? no  If Present has eating disorder been in remission at least 3 years? na    Dietary History  Meals per day: 3  Snacks: occasional  Typical Snack: sweets (cookies, ice cream, candy)  Who does the grocery shopping?  and patient  Who does the cooking? patient  A typical meal includes: B: baked oatmeal, yogurt, fruit and nuts  L: leftovers  D: Stew or pork chops and veggies  Regular Pop: none  Juice: none  Caffeine: coffee 1-2 cups per day with splenda and sugared creamer  Amount of restaurant eating per week: 2-3  Eating a the table with the TV off? no    Physical Activity Patterns  Current physical activity routine includes: swims 1 hour per week    Limitations from being physically active on a regular basis includes: time, energy        PMH:History reviewed. No pertinent past medical  "history.    Past Surgical Hx:History reviewed. No pertinent surgical history.    Medication:  No current outpatient prescriptions on file prior to visit.     No current facility-administered medications on file prior to visit.        Allergies:Ace inhibitors; Penicillins; and Sulfasalazine    Family Hx:History reviewed. No pertinent family history.    Social Hx:  Social History     Social History     Marital status:      Spouse name: N/A     Number of children: N/A     Years of education: N/A     Occupational History     Not on file.     Social History Main Topics     Smoking status: Never Smoker     Smokeless tobacco: Never Used     Alcohol use Yes      Comment: Occasional     Drug use: No     Sexual activity: Not on file     Other Topics Concern     Not on file     Social History Narrative     No narrative on file       Tobacco Use Hx:  History   Smoking Status     Never Smoker   Smokeless Tobacco     Never Used       ROS  General  Fatigue: yes  Sleep Quality:good, uses CPAP, usually gets 8-9  HEENT  Hx of glaucoma: no  Vision changes: no  Cardiovascular  Chest Pain with Exertion: no  Palpitations: no  Hx of heart disease: no  Pulmonary  Shortness of breath at rest: no  Shortness of breath with exertion: yes  Snoring: yes  Stop-bang score: na  Winton Score: na  Gastrointestinal  Heartburn: yes  Abdominal pain: mp  Psychiatric  Moods Stable: yes  Endocrine  Polydipsia: no  Polyuria: no  Neurologic:  Hx of seizures: no  Dermatologic  Rashes: no      /64 (Patient Site: Right Arm, Patient Position: Sitting, Cuff Size: Adult Large)  Pulse 79  Ht 5' 1.5\" (1.562 m)  Wt (!) 354 lb (160.6 kg)  LMP 08/13/2018 (Exact Date)  SpO2 95%  Breastfeeding? No  BMI 65.8 kg/m2  Wt Readings from Last 2 Encounters:   08/15/18 (!) 354 lb (160.6 kg)     Body mass index is 65.8 kg/(m^2).  Neck circumference/Waist Circumference: Height: 5' 1.5\" (1.562 m) (8/15/2018  9:36 AM)  Initial Weight: 354 lbs (8/15/2018  9:36 " AM)  Weight: 354 lb (160.6 kg) (8/15/2018  9:36 AM)  Weight loss from initial: 0 (8/15/2018  9:36 AM)  % Weight loss: 0 % (8/15/2018  9:36 AM)  BMI (Calculated): 65.8 (8/15/2018  9:36 AM)  SpO2: 95 % (8/15/2018  9:36 AM)  Waist Circumference (In): 56.5 Inches (8/15/2018  9:36 AM)  Hip Circumference (In): 64.5 Inches (8/15/2018  9:36 AM)  Neck Circumference (In): 17.75 Inches (8/15/2018  9:36 AM)      Physical Exam:    GEN: Alert and oriented in no acute distress.   HEENT: PERRLA, mucous membranes moist. Airway adequate  NECK: Supple without LAD or thyromegaly. Carotid bruits absent.  LUNGS: CTA without wheezes or crackles, good air movement throughout  CV: RRR no MRG  ABDOMEN: moderate protuberance, BS normal, non tender to palpation, no rebound or guarding, no HSM  SKIN: no rashes, no skin tags, positive acanthosis nigrans,   EXTREMITIES: noedema, dorsalis pedis palpable    Labs:    No results found for: WBC, HGB, HCT, MCV, PLT  No results found for: CHOL  No results found for: HDL  No results found for: LDLCALC  No results found for: TRIG  No components found for: CHOLHDL  No results found for: ALT, AST, GGT, ALKPHOS, BILITOT  No results found for: HGBA1C  No results found for: FCLANIBQ14    Much or all of the text in this note was generated through the use of Dragon Dictate voice-to-text software. Errors in spelling or words which seem out of context are unintentional. Sound alike errors, in particular, may have escaped editing.

## 2021-07-15 NOTE — PATIENT INSTRUCTIONS - HE
Patient Instructions by Laura Dominguez PT at 2/28/2019  3:30 PM     Author: Laura Dominguez PT Service: -- Author Type: Physical Therapist    Filed: 2/28/2019  4:05 PM Encounter Date: 2/28/2019 Status: Signed    : Laura Dominguez PT (Physical Therapist)        BRACE - BILATERAL BENT LEG LIFT    While lying on your back with your knees bent,  raise up one foot then the other, then slowly lower one down and then the other.   . Use your stomach muscles to keep your spine from moving.    And BREATH!      Gastroc Stair Stretch    Stand with the middle of your foot on the edge of the stairs while holding onto the railing. Slowly drop heels off until you feel a stretch in the back of your legs keeping your knees straight.       ` Full depth calf raise on a box (or stair)    Stand on a box so that your heels are able to drop down below the box. This is your starting position. Next, raise up onto your toes so that you are standing as tall as possible. Lower back down to the starting position and repeat the exercise.

## 2021-07-15 NOTE — PROGRESS NOTES
[facility] Daily Progress     Patient Name: Nakia Melo  Date: 1/25/2019  Visit #: 2  PTA visit #:    Referral Diagnosis: Left Knee  Referring provider: Velma Barry MD  Visit Diagnosis:     ICD-10-CM    1. Acute pain of left knee M25.562    2. Muscle weakness (generalized) M62.81      Initial Assessment   Nakia Melo is a 48 y.o. female who presents to therapy today with chief complaints of left knee pain that started in mid December and is progressively getting a little worse again. Patient reports pain with initially standing after sleep and sitting for a longer periord of time, increased pain limiting sleep and pain when walking, kneeling and bending the knees. Patient has negative special tests for ligaments or meniscal injuries. Patient will benefit from skilled therapy to increase joint mobility, ROM, strength in order to decrease pain and improve mobility    Assessment:     HEP/POC compliance is  good .  Patient demonstrates understanding/independence with home program.  Patient is benefitting from skilled physical therapy and is making steady progress toward functional goals.  Patient is appropriate to continue with skilled physical therapy intervention, as indicated by initial plan of care.    Goal Status:  Pt. will be independent with home exercise program in : 6 weeks  Pt. will have improved quality of sleep: waking less times/night;with less pain;in 6 weeks  Pt. will be able to walk : 20 minutes;with less pain;with less difficulty;for community mobility;for exercise/recreation;in 6 weeks  Pt. will bend: to dress;to clean;with less pain;with less difficulty;in 6 weeks  Patient will transfer: sit/stand;supine/sit;for in/out of bed;for in/out of chair;with less pain;in 6 weeks    No Data Recorded    Plan / Patient Education:     Continue with initial plan of care.  Progress with home program as tolerated.  Plan for next visit: patient wishes to trial on her own, hold x 30  days. If she returns then continued with  joint mobs, NuStep, progress exercises, TKE, clam shells, bridges, SLR  Subjective:     Pain Rating: 3  Pain is better than before but still sore. Right knee is a little sore today as well.       Objective:     HEP  - SLR supine x 10 reps   - TKE L3 band x 10 reps     Treatment Today   1/25/2019  TREATMENT MINUTES COMMENTS   Evaluation     Self-care/ Home management     Manual therapy 10 Patellar mobs medial and lateral glides  AP and rotational knee mobs in hooklying and sitting    Neuromuscular Re-education     Therapeutic Activity     Therapeutic Exercises 20 NuStep WL 5 x 6 min   SLR x 15 reps B  Reviewed TKE L3 band   Added   - hooklying hip abduction (clamshells) x 15 reps   - bridges x 10 reps, added 5 sec hold and hip and hip abduction x 5 reps   - standing hip abduction L4 band x 10 reps each side cues to unlock hyperextended knee   Gait training     Modality__________________                Total     Blank areas are intentional and mean the treatment did not include these items.       Laura Dominguez  1/25/2019

## 2021-07-15 NOTE — PROGRESS NOTES
Non-surgical Weight Loss Initial Diet Evaluation     Assessment:  Pt is a 47 y.o. female being seen today for non-surgical RD nutritional evaluation. Today we reviewed current eating habits and level of physical activity, and instructed on the changes that are required for successful weight loss outcomes.    Personal Goals: Pt would like to lose weight and increase energy   -depression   Personal goal weight: Pt would like to get to 200lbs; 250lbs weight longest      Pt Active Problem List Diagnosis:     Patient Active Problem List   Diagnosis     Obstructive sleep apnea     Depression, unspecified depression type     Anxiety     Hypertension, unspecified type     Hyperlipidemia, unspecified hyperlipidemia type     Pre-diabetes     Morbid obesity (H)     IgA nephropathy     Phentermine: 1 full tab daily     Pt's Initial Weight: 354 lbs  Weight: 353 lb (160.1 kg)  Weight loss from initial: 1  % Weight loss: 0.28 %  BMI: Body mass index is 65.62 kg/(m^2).  IBW: 108 lbs    Estimated RMR (Quay-St Jeor equation): 2190 calories  Protein requirements (.5grams to .9grams per pound IBW, 20-30% of calories, minimum of 60-80gm per day):   grams     Food allergies, intolerances, Yarsani customs: none    Pre-Diabetes    Vitamins/Mineral Supplementation: none    Biggest struggle with weight loss: depression; likes eating; emotional/stress eating   - also losing weight     Who does the grocery shopping for your household? Self and sometimes    Who prepares your meals at home? Self    -very low energy throughout the day   Diet Recall/Time: wakes at 630am  Breakfast: oatmeal w/ greek yogurt w/ fruit and nuts OR bfast sandwich OR oatmeal bar   Am Snack: occasional yogurt    Lunch: leftover Pro/CHO   Pm snack:none  Dinner: Pro/CHO   HS Snack: ice cream     Typical Snacks: ice cream , chips/candy at work     Fats used at home: olive oil     Fried Foods: 0-1 times per week    Meals per week away from home:  2-3X/week    Recommended limiting eating out to no more than 2x/week.  Patient and I reviewed the importance of eating three consistent meals per day; as well as meal timing to be spaced 4-5 hours apart.  Snack choices: 100-150 calories (1-2x/day if physically hungry), incorporating a fruit/vegetable w/ protein source.    Portion Sizes problematic? yes per patient/diet recall  Encouraged slowing meal times down, 20-30 minutes, chewing to applesauce consistency.   To aid in proper portion control and slow meal time down discussed consuming meals off smaller plates, use toddler/children utensils and set utensils down after each bite.    Protein, vegetables/fruits, carbohydrates:   Reviewed lean protein sources today. Recommended consuming 20-25gm protein at 3 meals daily.  The patient and I discussed the importance of including lean/low fat protein at each meal and limiting carbohydrate intake to less than 25% of plate volume.     Beverages (Type/Oz. per day)  Water: 32-64oz   Coffee: 1 cup daily w/ protein drinks   Tea: none  Milk: none  Regular soda: none  Diet soda: occasional   Juice: none  Chito-Aid/lemonade/etc: rare  Alcohol: none    Discussed the importance of adequate hydration and the goal of 64+ oz of fluid daily.   The patient understands the importance of avoiding all alcoholic and sweetened drinks, and instead choosing 64 oz plain water.    Exercise  ADL   Water aerobics - local gym     Pt's understands that 45-60 minutes of daily activity is an important part of weight loss success.   Encouraged pt to incorporate upper body strength training exercise, even if its lifting soup cans while watching tv at night, doing push ups/sit-ups, and abdominal work.    PES statement:    1. (NI-1.3)Excessive energy intake related to Food and nutrition related knowledge deficit concerning excessive energy/oral intake as evidenced by Intake of high caloric density foods/beverages (juice, soda, alcohol) at meals and/or  snacks; large portions; frequent grazing; Estimated intake that exceeds estimated daily energy intake; Binge eating patterns; Frequent excessive fast food or restaurant intake; and BMI 65.62    2. (NC-3.3.5) Obese, class III, BMI ?40 related to physical inactivity as evidenced by Infrequent, low-duration and or low intensity physical activity; and Large amounts of sedentary activities; no structured physical activity regimen    Intervention  Discussion:  1. Educated pt on Eat Better, Move More, Live Well: Non-surgical Weight Loss Handout  2. Recommended to consume 20-25gm protein at 3 meals daily.  grams daily total.  Educated pt on food labels: keeping total sugar grams <10  Instructions/Goals:   1. Include 20-25gm protein at each meal.  2. Increase vegetable/fruit intake, by having a vegetable or fruit with each meal daily. Recommended pt to increase vegetable/fruit intake to 4-5 servings daily.  3. Increase fluid intake to 64oz daily: choose plain or calorie/alcohol-free beverages.  4. Incorporate daily structured activity, 45-60 minutes most days of the week  5. Read food labels more consistently: keeping total fat grams <10, total sugar grams <10, fiber >3gm per serving.  6. Practice plate method: 1/2 plate lean/low fat protein source, vegetable/fruit, <25% of plate complex carbohydrates.  7. Practice eating off of smaller plates/bowls, chewing to applesauce consistency, taking 20-30 minutes to eat in a calm/relaxed environment without distractions of tv/email/cell phone.    Handouts Provided:  Eat Better, Move More, Live Well: Non-surgical Weight Loss Handout  Protein Supplement List    Monitor/Evaluation:    Pt will f/u in one month with bariatrician, and f/u in two months with RD.    Plan for next visit with RD:  GOALS:  1)1X/week water aerobics (sat or sun)      Time In: 1:00p  Time Out: 1:40p    ABN signed: Yes

## 2021-10-03 ENCOUNTER — HEALTH MAINTENANCE LETTER (OUTPATIENT)
Age: 51
End: 2021-10-03

## 2022-01-23 ENCOUNTER — HEALTH MAINTENANCE LETTER (OUTPATIENT)
Age: 52
End: 2022-01-23

## 2022-05-15 ENCOUNTER — HEALTH MAINTENANCE LETTER (OUTPATIENT)
Age: 52
End: 2022-05-15

## 2022-09-10 ENCOUNTER — HEALTH MAINTENANCE LETTER (OUTPATIENT)
Age: 52
End: 2022-09-10

## 2023-04-30 ENCOUNTER — HEALTH MAINTENANCE LETTER (OUTPATIENT)
Age: 53
End: 2023-04-30

## 2023-06-03 ENCOUNTER — HEALTH MAINTENANCE LETTER (OUTPATIENT)
Age: 53
End: 2023-06-03